# Patient Record
Sex: FEMALE | Race: ASIAN | NOT HISPANIC OR LATINO
[De-identification: names, ages, dates, MRNs, and addresses within clinical notes are randomized per-mention and may not be internally consistent; named-entity substitution may affect disease eponyms.]

---

## 2019-03-05 PROBLEM — Z00.00 ENCOUNTER FOR PREVENTIVE HEALTH EXAMINATION: Status: ACTIVE | Noted: 2019-03-05

## 2019-03-13 ENCOUNTER — APPOINTMENT (OUTPATIENT)
Dept: DERMATOLOGY | Facility: CLINIC | Age: 24
End: 2019-03-13

## 2019-03-19 ENCOUNTER — APPOINTMENT (OUTPATIENT)
Dept: DERMATOLOGY | Facility: CLINIC | Age: 24
End: 2019-03-19
Payer: MEDICAID

## 2019-03-19 DIAGNOSIS — Z78.9 OTHER SPECIFIED HEALTH STATUS: ICD-10-CM

## 2019-03-19 PROCEDURE — 99202 OFFICE O/P NEW SF 15 MIN: CPT

## 2019-03-19 NOTE — PHYSICAL EXAM
[Alert] : alert [Oriented x 3] : ~L oriented x 3 [Well Nourished] : well nourished [Nose] : Nose [Eyelids] : Eyelids [Ears] : Ears [Lips] : Lips [FreeTextEntry3] : Type IV skin\par \par Wearing makeup\par Mild to moderate papules, some hyperpigmented\par Hyperpigmented macules and small patches present as well on the cheeks\par Moderate small comedones\par \par

## 2019-03-19 NOTE — HISTORY OF PRESENT ILLNESS
[FreeTextEntry1] : Acne [de-identified] : First visit for 23[place]year-old [place]  female with a two-year history of acne and dark spots on the face. No previous treatment. Menses recently irregular. Recently diagnosed with PCOS. To go on a birth control pill.

## 2019-04-08 ENCOUNTER — APPOINTMENT (OUTPATIENT)
Dept: DERMATOLOGY | Facility: CLINIC | Age: 24
End: 2019-04-08

## 2019-04-18 ENCOUNTER — APPOINTMENT (OUTPATIENT)
Dept: DERMATOLOGY | Facility: CLINIC | Age: 24
End: 2019-04-18
Payer: MEDICAID

## 2019-04-18 DIAGNOSIS — L81.0 POSTINFLAMMATORY HYPERPIGMENTATION: ICD-10-CM

## 2019-04-18 DIAGNOSIS — L70.0 ACNE VULGARIS: ICD-10-CM

## 2019-04-18 PROCEDURE — 99213 OFFICE O/P EST LOW 20 MIN: CPT

## 2019-04-18 RX ORDER — MINOCYCLINE HYDROCHLORIDE 100 MG/1
100 CAPSULE ORAL TWICE DAILY
Qty: 60 | Refills: 3 | Status: DISCONTINUED | COMMUNITY
Start: 2019-03-19 | End: 2019-04-18

## 2019-04-18 NOTE — PHYSICAL EXAM
[Alert] : alert [Oriented x 3] : ~L oriented x 3 [Well Nourished] : well nourished [FreeTextEntry3] : Face: rare papules\par Rare comedones\par Moderate macular hyperpigmentation, especially on cheeks

## 2019-04-18 NOTE — HISTORY OF PRESENT ILLNESS
[FreeTextEntry1] : Acne vulgaris [de-identified] : Followup visit for 23-year-old [place]  female first seen by me on March 19, 2019, for acne vulgaris with postinflammatory hyperpigmentation. Patient recently diagnosed with PCOS.\par Patient now on birth control pill.\par Patient complains of having developed severe headache, vomiting, and bad taste in mouth \par After being on the minocycline for 4 days. Discontinued the minocycline. Patient feels better.

## 2019-06-05 ENCOUNTER — APPOINTMENT (OUTPATIENT)
Dept: DERMATOLOGY | Facility: CLINIC | Age: 24
End: 2019-06-05

## 2020-08-08 ENCOUNTER — EMERGENCY (EMERGENCY)
Facility: HOSPITAL | Age: 25
LOS: 1 days | Discharge: HOME | End: 2020-08-08
Attending: EMERGENCY MEDICINE
Payer: COMMERCIAL

## 2020-08-08 VITALS
HEART RATE: 114 BPM | TEMPERATURE: 98 F | HEIGHT: 64 IN | OXYGEN SATURATION: 100 % | WEIGHT: 160.06 LBS | DIASTOLIC BLOOD PRESSURE: 86 MMHG | RESPIRATION RATE: 16 BRPM | SYSTOLIC BLOOD PRESSURE: 120 MMHG

## 2020-08-08 PROCEDURE — 99283 EMERGENCY DEPT VISIT LOW MDM: CPT

## 2020-08-08 RX ORDER — KETOROLAC TROMETHAMINE 0.5 %
1 DROPS OPHTHALMIC (EYE)
Qty: 1 | Refills: 0
Start: 2020-08-08 | End: 2020-08-14

## 2020-08-08 RX ORDER — ERYTHROMYCIN BASE 5 MG/GRAM
1 OINTMENT (GRAM) OPHTHALMIC (EYE)
Qty: 1 | Refills: 0
Start: 2020-08-08 | End: 2020-08-14

## 2020-08-08 RX ORDER — IBUPROFEN 200 MG
600 TABLET ORAL ONCE
Refills: 0 | Status: DISCONTINUED | OUTPATIENT
Start: 2020-08-08 | End: 2020-08-13

## 2020-08-08 RX ORDER — ERYTHROMYCIN BASE 5 MG/GRAM
1 OINTMENT (GRAM) OPHTHALMIC (EYE) ONCE
Refills: 0 | Status: COMPLETED | OUTPATIENT
Start: 2020-08-08 | End: 2020-08-08

## 2020-08-08 RX ADMIN — Medication 1 APPLICATION(S): at 17:45

## 2020-08-08 NOTE — ED PROVIDER NOTE - CARE PROVIDER_API CALL
Willig, Jeffrey L  OPHTHALMOLOGY  [address] [place] [address].  [place] [address]  Phone: [phone]  Fax: [phone]  Follow Up Time:

## 2020-08-08 NOTE — ED PROVIDER NOTE - CLINICAL SUMMARY MEDICAL DECISION MAKING FREE TEXT BOX
corneal abrasion and chemical exposure of eye; eye was anesthetized with tetracaine drops; then irrigiated with 1 liter normal saline using a elia lens. patient feeling marginally better; ok for d/c, expressed need for ophtho f/u this week; ok for d/c with ketoralac ophthalmic and erythromycin gtts

## 2020-08-08 NOTE — ED PROVIDER NOTE - PATIENT PORTAL LINK FT
You can access the FollowMyHealth Patient Portal offered by [facility] by registering at the following website: [email or web address]. By joining [employer]’s FollowMyHealth portal, you will also be able to view your health information using other applications (apps) compatible with our system.

## 2020-08-08 NOTE — ED PROVIDER NOTE - OBJECTIVE STATEMENT
24 yo female no sign PMH 26 yo female no sign PMH p/w burning severe right eye pain x 1 hr s/p Tide detergent pod bursting in her eye; patient was trying to squeeze the pod in preparation for doing the laundry, and it burst, getting into right eye; irrigated and wiped eye as best she could at home, but as pain ensued, decided to come here.

## 2020-08-08 NOTE — ED PROVIDER NOTE - EYES, MLM
right eye vision 20/100; left eye 20/70; right eye with fluorescene corneal uptake at 1 oclock position; EOMI and PERRLA; +conjunctival injection

## 2020-08-08 NOTE — ED PROVIDER NOTE - NSFOLLOWUPINSTRUCTIONS_ED_ALL_ED_FT
Corneal Abrasion     A corneal abrasion is a scratch or injury to the clear covering over the front of the eye (cornea). Your cornea forms a clear dome that protects your eye and helps to focus your vision. Your cornea is made up of many layers, but the surface layer is one of the most sensitive tissues in your body. A corneal abrasion can be very painful.  If a corneal abrasion is not treated, it can become infected and cause an ulcer. This can lead to scarring. A scarred cornea can affect your vision. Sometimes abrasions come back in the same area, even after the original injury has healed.  What are the causes?  This condition may be caused by:  A poke in the eye.A gritty or irritating substance (foreign body) in the eye.Excessive eye rubbing.Very dry eyes.Certain eye infections.Contact lenses that fit poorly or are worn for a long period of time. You can also injure your cornea when putting contact lenses in your eye or taking them out.Eye surgery.Certain cornea problems may increase the chance of a corneal abrasion.Sometimes, the cause is not known.  What are the signs or symptoms?  Symptoms of this condition include:  Eye pain. The pain may get worse when you open and close your eye or when you move your eye.A feeling of something stuck in your eye.Tearing, redness, and sensitivity to light.Having trouble keeping your eye open, or not being able to keep it open.Blurred vision.Headache.How is this diagnosed?  You may work with a health care provider who specializes in diseases and conditions of the eye (ophthalmologist). This condition may be diagnosed based on your medical history, symptoms, and an eye exam.  Before the eye exam, numbing drops may be put into your eye. You may also have dye put in your eye with a dropper or a small paper strip. The dye makes the abrasion easy to see when your ophthalmologist examines your eye with a light. Your ophthalmologist may look at your eye through an eye scope (slit lamp).  How is this treated?  Treatment may vary depending on the cause of your condition, and it may include:  Washing out your eye.Removing any foreign bodies that are in your eye.Using antibiotic drops or ointment to treat or prevent an infection.Using a dilating drop to decrease inflammation and pain.Using steroid drops or ointment to treat redness, irritation, or inflammation.Applying a cold, wet cloth (cold compress) or ice pack to ease the pain.Taking pain medicine by mouth (orally).In some cases, an eye patch or bandage soft contact lens might also be used. An eye patch should not be used if the corneal abrasion was related to contact lens wear as it can increase the chance of infection in these eyes.  Follow these instructions at home:  Medicines     Use eye drops or ointments as told by your health care provider.If you were prescribed antibiotic drops or ointment, use them as told by your health care provider. Do not stop using the antibiotic even if you start to feel better.Take over-the-counter and prescription medicines only as told by your health care provider.Ask your health care provider if the medicine prescribed to you:  Requires you to avoid driving or using heavy machinery.Can cause constipation. You may need to take these actions to prevent or treat constipation:  Drink enough fluid to keep your urine pale yellow.Take over-the-counter or prescription medicines.Eat foods that are high in fiber, such as beans, whole grains, and fresh fruits and vegetables.Limit foods that are high in fat and processed sugars, such as fried or sweet foods.Eye patch use     If you have an eye patch, wear it as told by your health care provider.  Do not drive or use machinery while wearing an eye patch. Your ability to  distances will be impaired.Follow instructions from your health care provider about when to remove the patch.General instructions     Ask your health care provider whether you can use a cold compress on your eye to relieve pain.Do not rub or touch your eye. Do not wash out your eye.Do not wear contact lenses until your health care provider says that this is okay.Avoid bright light and eye strain.Keep all follow-up visits as told by your health care provider. This is important for preventing infection and vision loss.Contact a health care provider if:  You continue to have eye pain and other symptoms for more than 2 days.You have new symptoms, such as worse redness, tearing, or discharge.You have discharge that makes your eyelids stick together in the morning.Your eye patch becomes so loose that you can blink your eye.Symptoms return after the original abrasion has healed.Get help right away if:  You have severe eye pain that does not get better with medicine.You have vision loss.Summary  A corneal abrasion is a scratch or injury to the clear covering over the front of the eye (cornea).It is important to get treatment for a corneal abrasion. If this problem is not treated, it can affect your vision.Use eye drops or ointments as told by your health care provider.If you have an eye patch, do not drive or use machinery while wearing it. Your ability to  distances will be impaired.Let your health care provider know if your symptoms continue for more than 2 days.This information is not intended to replace advice given to you by your health care provider. Make sure you discuss any questions you have with your health care provider.

## 2021-06-01 PROBLEM — Z78.9 OTHER SPECIFIED HEALTH STATUS: Chronic | Status: ACTIVE | Noted: 2020-08-08

## 2021-06-08 ENCOUNTER — APPOINTMENT (OUTPATIENT)
Dept: CARDIOLOGY | Facility: CLINIC | Age: 26
End: 2021-06-08
Payer: MEDICAID

## 2021-06-08 ENCOUNTER — NON-APPOINTMENT (OUTPATIENT)
Age: 26
End: 2021-06-08

## 2021-06-08 VITALS
HEIGHT: 64 IN | OXYGEN SATURATION: 99 % | WEIGHT: 178 LBS | TEMPERATURE: 99.1 F | HEART RATE: 123 BPM | DIASTOLIC BLOOD PRESSURE: 58 MMHG | SYSTOLIC BLOOD PRESSURE: 100 MMHG | BODY MASS INDEX: 30.39 KG/M2

## 2021-06-08 VITALS — DIASTOLIC BLOOD PRESSURE: 64 MMHG | SYSTOLIC BLOOD PRESSURE: 118 MMHG

## 2021-06-08 DIAGNOSIS — Z82.49 FAMILY HISTORY OF ISCHEMIC HEART DISEASE AND OTHER DISEASES OF THE CIRCULATORY SYSTEM: ICD-10-CM

## 2021-06-08 DIAGNOSIS — R00.0 TACHYCARDIA, UNSPECIFIED: ICD-10-CM

## 2021-06-08 DIAGNOSIS — Z56.0 UNEMPLOYMENT, UNSPECIFIED: ICD-10-CM

## 2021-06-08 DIAGNOSIS — Z78.9 OTHER SPECIFIED HEALTH STATUS: ICD-10-CM

## 2021-06-08 DIAGNOSIS — Z87.898 PERSONAL HISTORY OF OTHER SPECIFIED CONDITIONS: ICD-10-CM

## 2021-06-08 DIAGNOSIS — E28.2 POLYCYSTIC OVARIAN SYNDROME: ICD-10-CM

## 2021-06-08 PROCEDURE — 99203 OFFICE O/P NEW LOW 30 MIN: CPT

## 2021-06-08 PROCEDURE — 93000 ELECTROCARDIOGRAM COMPLETE: CPT | Mod: 59

## 2021-06-08 RX ORDER — DESOGESTREL AND ETHINYL ESTRADIOL 0.15-0.03
KIT ORAL DAILY
Refills: 0 | Status: ACTIVE | COMMUNITY

## 2021-06-08 SDOH — ECONOMIC STABILITY - INCOME SECURITY: UNEMPLOYMENT, UNSPECIFIED: Z56.0

## 2021-06-08 NOTE — ASSESSMENT
[FreeTextEntry1] : 26 y/o F with PMH of PCOS (diagnosed 3 yrs, has been on birth control every since being diagnosed) presented from PMD with tachycardia \par \par 1. Tachycardia \par - EKG reviewed and shows sinus tachycardia \par - no dizziness or presyncopal episodes \par - patient states TSH was done at PMD \par - on OCP with mild lower extremity edema \par \par Plan: \par - will obtain TTE to assess LV function and valvulopathy \par - Holter monitor for 2 weeks \par - will obtain results of blood work from PMD/NP Nakia Zarate NP [place] [address]\par Tele: [phone] Fax [phone]\par - patient is on OCPs with mild lower extremity edema, will obtain US duplex of the bilateral lower extremities \par - will after TTE and Holter monitor will obtain exercise stress test \par - follow up in 3 months \par \par Heather Hood D.O. FACC\par Cardiology\par

## 2021-06-08 NOTE — REASON FOR VISIT
[FreeTextEntry1] : 24 y/o F with PMH of PCOS (diagnosed 3 yrs, has been on birth control every since being diagnosed) presented from PMD with tachycardia \par \par Patient notes that she has been having tachycardia all her life (approx 10 yrs) and has never had this worked up. NOtes that since changing PCP the PMD was concerned and told her to see a cardiologist. \par Patient notes that the palpitations / tachycardia has been the same and not worsened over the past couple of yrs but notes that palpitations occurs with rest and upon exertion. She states that at night is when she mostly notes the palpitations and feels like there is fullness in the throat, and when she rests on her R side she notes that she feels the pounding in her chest. \par No associated dizziness or passing out \par Over the past 3-4 yrs has been having exertional shortness of breath but no lower extremity edema orthopnea. \par Drinks 1 cup of tea\par Drinks 5-6 glasses of water \par Notes that her HR is usually 110-117 bpm \par In the office -116\par \par Family hx: HA with grandmother and grandfather \par Smoking: none\par Alcohol use \par allergies:L none \par \par On Birth control Desogestrel-Ethinyl Estradiol

## 2021-06-08 NOTE — PHYSICAL EXAM

## 2021-06-08 NOTE — REVIEW OF SYSTEMS
[SOB] : shortness of breath [Dyspnea on exertion] : dyspnea during exertion [Lower Ext Edema] : lower extremity edema [Palpitations] : palpitations [Negative] : Heme/Lymph [Chest Discomfort] : no chest discomfort [Leg Claudication] : no intermittent leg claudication [Orthopnea] : no orthopnea [PND] : no PND [Syncope] : no syncope

## 2021-06-23 ENCOUNTER — APPOINTMENT (OUTPATIENT)
Dept: CARDIOLOGY | Facility: CLINIC | Age: 26
End: 2021-06-23
Payer: MEDICAID

## 2021-06-23 PROCEDURE — 93306 TTE W/DOPPLER COMPLETE: CPT

## 2021-08-16 ENCOUNTER — APPOINTMENT (OUTPATIENT)
Dept: CARDIOLOGY | Facility: CLINIC | Age: 26
End: 2021-08-16

## 2021-09-13 ENCOUNTER — APPOINTMENT (OUTPATIENT)
Dept: CARDIOLOGY | Facility: CLINIC | Age: 26
End: 2021-09-13
Payer: MEDICAID

## 2021-09-13 VITALS
BODY MASS INDEX: 30.22 KG/M2 | DIASTOLIC BLOOD PRESSURE: 64 MMHG | HEART RATE: 97 BPM | OXYGEN SATURATION: 99 % | HEIGHT: 64 IN | TEMPERATURE: 98.4 F | SYSTOLIC BLOOD PRESSURE: 125 MMHG | WEIGHT: 177 LBS | RESPIRATION RATE: 16 BRPM

## 2021-09-13 DIAGNOSIS — R00.2 PALPITATIONS: ICD-10-CM

## 2021-09-13 PROCEDURE — 99214 OFFICE O/P EST MOD 30 MIN: CPT

## 2021-09-13 RX ORDER — CLINDAMYCIN PHOSPHATE 10 MG/ML
1 LOTION TOPICAL
Qty: 1 | Refills: 3 | Status: DISCONTINUED | COMMUNITY
Start: 2019-04-18 | End: 2021-09-13

## 2021-09-14 PROBLEM — R00.2 PALPITATIONS: Status: ACTIVE | Noted: 2021-06-08

## 2021-10-15 NOTE — ASSESSMENT
[FreeTextEntry1] : 26 y/o F with PMH of PCOS (diagnosed 3 yrs, has been on birth control every since being diagnosed) presented from PMD with tachycardia \par \par 1. Tachycardia \par - EKG reviewed and shows sinus tachycardia \par - no dizziness or presyncopal episodes \par -TSh normal 1.68\par - on OCP with mild lower extremity edema \par - TTE reviewed normal LVEF \par - Holter shows episodes of SVT but patient now notes interval improvement in symptoms \par \par Plan: \par - reviewed results of blood work from PMD/NP Nakia Zarate NP [place] [address] Tele: [phone] Fax [phone] and also reviewed the echocardiogram \par - no US duplex of the bilateral lower extremities obtained \par - follow up in 6 months-1 yr\par \par Heather Hood D.O. FACFREYA\par Cardiology\par

## 2021-10-15 NOTE — CARDIOLOGY SUMMARY
[de-identified] : 6/8/2021: Sinus tachycardia @ 115 bpm  [de-identified] : Holter monitor for 13 days and 20 hrs \par Max  bpm, Min 63 bpm \par 2 minutes of SVT \par  [de-identified] : 6/24/2021: LVEF 55-60%, Biplane MOD 56% \par Normal RV function and size \par No significant valvular disease \par Mildly dilated pulmonary artery \par There is no evidence of pericardial effusion and no prior studies are available for comparison

## 2021-10-15 NOTE — HISTORY OF PRESENT ILLNESS
[FreeTextEntry1] : 24 y/o F with PMH of PCOS (diagnosed 3 yrs, has been on birth control every since being diagnosed) presented from PMD with tachycardia \par \par Follow up 9/13/2021\par Patient notes that she has been doing well with occasional palpitations but no associated lightheadedness or syncope. Notes that she has no chest pain or shortness of breath at rest or upon exertion, no lower extremities, orthopnea or PND.  \par Discussed the results of the TTE and holter monitor. \par \par Initial Eval 6/8/2021\par Patient notes that she has been having tachycardia all her life (approx 10 yrs) and has never had this worked up. NOtes that since changing PCP the PMD was concerned and told her to see a cardiologist. \par Patient notes that the palpitations / tachycardia has been the same and not worsened over the past couple of yrs but notes that palpitations occurs with rest and upon exertion. She states that at night is when she mostly notes the palpitations and feels like there is fullness in the throat, and when she rests on her R side she notes that she feels the pounding in her chest. \par No associated dizziness or passing out \par Over the past 3-4 yrs has been having exertional shortness of breath but no lower extremity edema orthopnea. \par Drinks 1 cup of tea\par Drinks 5-6 glasses of water \par Notes that her HR is usually 110-117 bpm \par In the office -116\par \par Family hx: HA with grandmother and grandfather \par Smoking: none\par Alcohol use \par allergies:L none \par \par On Birth control Desogestrel-Ethinyl Estradiol \par

## 2022-01-10 NOTE — ED PROVIDER NOTE - CROS ED RESP ALL NEG
If you have any non-emergent questions or concerns, please call the office at [phone].  If at any point in time you start to experience thoughts of harming yourself or others, if you feel unsafe, experience auditory or visual hallucinations, or if your symptoms severely worsen please call 911 or present to your nearest emergency room.     Suicide Hotline: [phone]    Treatment plan:  · Cross taper sertraline to duloxetine (informational handout below) due to chronic pain, depression, anxiety  · Week 1: Decrease sertraline to 150 mg daily, start duloxetine 30 mg daily w/ food  · Week 2: Decrease sertraline to 100 mg daily, continue duloxetine 30 mg daily  · Week 3: Decrease sertraline to 50 mg daily, increase duloxetine to 60 mg daily  · Week 4: Discontinue sertraline, continue duloxetine 60 mg daily  · Monitor for serotonin syndrome (information below)  · Continue elavil and trazodone      Serotonin Syndrome    Although rare, a side effect that can happen when taking multiple serotonergic agents is a buildup of too much serotonin in the nervous system. Serotonin syndrome symptoms typically occur within several hours of taking a new drug or increasing the dose of a drug you're already taking.    Signs and symptoms include:  · Agitation or restlessness  · Confusion  · Rapid heart rate and high blood pressure  · Dilated pupils  · Loss of muscle coordination or twitching muscles  · Heavy sweating  · Diarrhea  · Headache  · Shivering  · Goose Bumps     If you develop any of these symptoms, stop taking the new medication immediately and call us at the next available opportunity.    Severe serotonin syndrome can be life-threatening.   Signs and symptoms include:  · High fever  · Seizures  · Irregular heartbeat  · Unconsciousness     If you develop any of these symptoms, call an ambulance or go immediately to an emergency room.    Patient Education     Duloxetine Delayed Release Capsule 30 mg  Uses  This medicine  is used for the following purposes:  · anxiety  · depression  · eating disorders  · muscle aches  · pain  Instructions  Swallow the medicine without crushing or chewing it.  This medicine may be taken with or without food.  It is very important that you take the medicine at about the same time every day. It will work best if you do this.  Keep the medicine at room temperature. Avoid heat and direct light.  It may take several weeks for this medicine to fully work.  It is important that you keep taking each dose of this medicine on time even if you are feeling well.  If you forget to take a dose on time, take it as soon as you remember. If it is almost time for the next dose, do not take the missed dose. Return to your normal dosing schedule. Do not take 2 doses of this medicine at one time.  Please tell your doctor and pharmacist about all the medicines you take. Include both prescription and over-the-counter medicines. Also tell them about any vitamins, herbal medicines, or anything else you take for your health.  Do not suddenly stop taking this medicine. Check with your doctor before stopping.  Cautions  Tell your doctor and pharmacist if you ever had an allergic reaction to a medicine. Symptoms of an allergic reaction can include trouble breathing, skin rash, itching, swelling, or severe dizziness.  Do not use the medication any more than instructed.  This medicine may cause dizziness or fainting, especially after exercising or in hot weather. Be very careful when standing or sitting up quickly.  Your ability to stay alert or to react quickly may be impaired by this medicine. Do not drive or operate machinery until you know how this medicine will affect you.  Please check with your doctor before drinking alcohol while on this medicine.  Family should check on the patient often. Call the doctor if patient becomes more depressed, has thoughts of suicide, or shows changes in behavior.  Call the doctor if there are  any signs of confusion or unusual changes in behavior.  Tell the doctor or pharmacist if you are pregnant, planning to be pregnant, or breastfeeding.  Ask your pharmacist if this medicine can interact with any of your other medicines. Be sure to tell them about all the medicines you take.  Do not start or stop any other medicines without first speaking to your doctor or pharmacist.  Do not share this medicine with anyone who has not been prescribed this medicine.  This medicine can cause serious side effects in some patients. Important information from the [place] Food and Drug Administration (FDA) is available from your pharmacist. Please review it carefully with your pharmacist to understand the risks associated with this medicine.  Side Effects  The following is a list of some common side effects from this medicine. Please speak with your doctor about what you should do if you experience these or other side effects.  · decreased appetite  · constipation  · dizziness  · drowsiness or sedation  · dry mouth  · lack of energy and tiredness  · nausea  · stomach upset or abdominal pain  · sweating  · vomiting  Call your doctor or get medical help right away if you notice any of these more serious side effects:  · agitated feeling or trouble sleeping  · confusion  · coughing up blood or vomit that looks like coffee grounds  · fainting  · hallucinations (unusual thoughts, seeing or hearing things that are not real)  · rapid heartbeat  · symptoms of liver damage (such as yellowing of skin or eyes, dark urine, unusual tiredness or weakness; severe stomach or back pain)  · muscle aches, spasms or abnormal movements  · muscle weakness  · dilation of the pupils  · seizures  · problems with sexual functions or desire  · shakiness  · blood in stool  · dark, tarry stool  · blurring or changes of vision  · severe or persistent vomiting  A few people may have an allergic reactions to this medicine. Symptoms can include difficulty  breathing, skin rash, itching, swelling, or severe dizziness. If you notice any of these symptoms, seek medical help quickly.  Extra  Please speak with your doctor, nurse, or pharmacist if you have any questions about this medicine.  [email or web address]  IMPORTANT NOTE: This document tells you briefly how to take your medicine, but it does not tell you all there is to know about it.Your doctor or pharmacist may give you other documents about your medicine. Please talk to them if you have any questions.Always follow their advice. There is a more complete description of this medicine available in English.Scan this code on your smartphone or tablet or use the web address below. You can also ask your pharmacist for a printout. If you have any questions, please ask your pharmacist.   © 2021 [employer]              negative...

## 2023-09-09 ENCOUNTER — NON-APPOINTMENT (OUTPATIENT)
Age: 28
End: 2023-09-09